# Patient Record
(demographics unavailable — no encounter records)

---

## 2024-11-22 NOTE — PHYSICAL EXAM
[Chaperone Present] : A chaperone was present in the examining room during all aspects of the physical examination [Appropriately responsive] : appropriately responsive [Alert] : alert [No Acute Distress] : no acute distress [Soft] : soft [Non-tender] : non-tender [Non-distended] : non-distended [No HSM] : No HSM [No Lesions] : no lesions [No Mass] : no mass [Oriented x3] : oriented x3 [Vulvar Atrophy] : vulvar atrophy [Labia Majora] : normal [Labia Minora] : normal [Atrophy] : atrophy [Normal] : normal [Uterine Adnexae] : normal [FreeTextEntry2] : [facility] [FreeTextEntry1] : vulvar hemangiomas/vascularities

## 2024-11-22 NOTE — PLAN
[FreeTextEntry1] : Colposcopy / Cervical Biopsy: UCG negative I will call pt in 5-7 days to review results D/w pt normal dark brown spotting that may occur after procedure Light exercise allowed Nothing per vagina including tampons or intercourse for 1 week Pt to call MD if she has heavy vaginal bleeding, fever or chills  Endometrial Biopsy 2/2 PMB: Could be related to vulvar hemangiomas seen on exam I will call pt with biopsy results in 5-7 days She is advised to call me if she experiences heavy vaginal bleeding, fever, chills or severe pain Adivsed to RTO for pelvic sono and SHG Bleeding possibly from vulvar hemangiomas  RTO for sono

## 2024-11-22 NOTE — HISTORY OF PRESENT ILLNESS
[FreeTextEntry1] : 2024. OMAR BARNETT 65 year old female  presents for colposcopy.  Pap smear done 24 - NILM, HRHPV positive, negative 16, 18/45. Abnl pap 3 years in a row  Pt reports of vaginal bleeding last week while traveling in [place] that lasted a few days.  Has never had vaginal bleeding before. Denies fever or chills. No vaginal discharge or vaginitis sxs.  GynHX: HPV positive in , . Colpo wnl per patient. She was advised to see Dr. Palomo, and did not go. No Hx fibroids or ovarian cysts.  PMH: denies Meds: multi vit, calcium OBHx: c/s (TIUP) girls, 2   boy,  girl, 2 miscarriages SHx: c/s x 1 FHx: Denies FHx of breast, ovarian, uterine, colon, pancreatic, prostate cancer. All: NKDA

## 2024-11-22 NOTE — PROCEDURE
[Colposcopy] : Colposcopy  [HPV High Risk] : HPV high risk [ECC Performed] : ECC performed [No Abnormalities] : no abnormalities [Biopsy] : biopsy taken [Hemostasis Obtained] : Hemostasis obtained [Endometrial Biopsy] : Endometrial biopsy [Time out performed] : Pre-procedure time out performed.  Patient's name, date of birth and procedure confirmed. [Consent Obtained] : Consent obtained [Post-Menop. Bleeding] : post-menopausal bleeding [Risks] : risks [Benefits] : benefits [Alternatives] : alternatives [Patient] : patient [Infection] : infection [Bleeding] : bleeding [Allergic Reaction] : allergic reaction [Uterine Perforation] : uterine perforation [Pain] : pain [Betadine] : Betadine [None] : none [Tenaculum] : Tenaculum [Anteverted] : anteverted [Moderate] : moderate [Specimen Collected] : collected [Sent to Pathology] : placed in buffered formalin and sent for pathology [Tolerated Well] : Patient tolerated the procedure well [No Complications] : No complications [Easy Passage] : Easy passage [Sounded to ___ cm] : sounded to [unfilled] ~Ucm [de-identified] : transition zone not visualized [de-identified] : 3 [de-identified] : 12:00, 3:00, 9:00, [facility] [de-identified] : acetowhite changes

## 2025-03-28 NOTE — PHYSICAL EXAM
[Chaperone Present] : A chaperone was present in the examining room during all aspects of the physical examination [Appropriately responsive] : appropriately responsive [Alert] : alert [No Acute Distress] : no acute distress [Oriented x3] : oriented x3 [Vulvar Atrophy] : vulvar atrophy [Labia Majora] : normal [Labia Minora] : normal [Normal] : normal [Uterine Adnexae] : normal [FreeTextEntry2] :  Juliana oseguera) [FreeTextEntry1] : L vulvar hemangioma

## 2025-03-28 NOTE — PROCEDURE
[Abnormal Uterine Bleeding] : abnormal uterine bleeding [Saline Infusion Sonography] : saline infusion sonography [Anteverted] : anteverted

## 2025-03-28 NOTE — HISTORY OF PRESENT ILLNESS
[FreeTextEntry1] : 03/28/2025. OMAR BARNETT 65 year year old female presents for a hysterogram due to PMB.     [TextBox_4] : Colposcopy 11/2024: benign  Endometrial Biopsy 12/2024: benign Pelvic Sonogram 12/2024: normal pelvic US  [PapSmeardate] : 09/2024 [TextBox_31] : NILM HPV +

## 2025-03-28 NOTE — PLAN
[FreeTextEntry1] :  Sonohysterogram:  UCG negative Pt tolerated well- discussed risk of bleeding or infection Findings: no polyp found Call MD if heavy bleeding, fever or chills.   L vulvar hemangioma:  Likely cause of PMB RTO  for vulvar biopsy   RTO in 2wk for vulvar biopsy

## 2025-04-21 NOTE — PHYSICAL EXAM
[Chaperoned Physical Exam] : A chaperone was present in the examining room during all aspects of the physical examination. [Appropriately responsive] : appropriately responsive [Alert] : alert [No Acute Distress] : no acute distress [Oriented x3] : oriented x3 [Labia Majora] : normal [Labia Minora] : normal [Normal] : normal [Uterine Adnexae] : normal [FreeTextEntry2] : Savanna Bueno [FreeTextEntry1] : L vulvar hemangioma x 2 SMaller ones noted on right, not bothersome to patinet

## 2025-04-21 NOTE — PLAN
[FreeTextEntry1] : L vulvar hemangioma: Vulvar biopsy performed x 2 Monels for hemostasis.  I will call pt with biopsy results in 5-7 days She is advised to call me if she experiences heavy vaginal bleeding, fever, chills or severe pain Bacitracin BID x 3 days.   RTO PRN or for annual

## 2025-04-21 NOTE — HISTORY OF PRESENT ILLNESS
[FreeTextEntry1] : 65 year old OAMR BARNETT pt presents for vulvar biopsy due to PMB.  3/28/25 pelvic exam showed L vulvar hemangioma. Pt feels well for today's visit. The hemagiomas bother her and cause itching  Colposcopy 11/2024: benign 3/2025 Sonohysterogram- no polyps Endometrial Biopsy 12/2024: benign Pelvic Sonogram 12/2024: normal pelvic US

## 2025-04-21 NOTE — PROCEDURE
[Vulvar Biopsy] : Vulvar Biopsy [Time out performed] : Pre-procedure time out performed.  Patient's name, date of birth and procedure confirmed. [Consent Obtained] : Consent obtained [Betadine] : Betadine [Sent to Pathology] : placed in buffered formalin and sent for pathology [Tolerated Well] : the patient tolerated the procedure well [No Complications] : there were no complications [] : on the left labia majora [Local Anesthesia] : local anesthesia [____ Lidocaine w/o Epi] : ~VmL lidocaine without epinephrine [de-identified] : pick ups and scissors